# Patient Record
Sex: FEMALE | Race: BLACK OR AFRICAN AMERICAN | NOT HISPANIC OR LATINO
[De-identification: names, ages, dates, MRNs, and addresses within clinical notes are randomized per-mention and may not be internally consistent; named-entity substitution may affect disease eponyms.]

---

## 2017-10-30 PROBLEM — Z00.00 ENCOUNTER FOR PREVENTIVE HEALTH EXAMINATION: Status: ACTIVE | Noted: 2017-10-30

## 2017-10-31 ENCOUNTER — LABORATORY RESULT (OUTPATIENT)
Age: 82
End: 2017-10-31

## 2017-10-31 ENCOUNTER — APPOINTMENT (OUTPATIENT)
Dept: NEPHROLOGY | Facility: CLINIC | Age: 82
End: 2017-10-31
Payer: MEDICARE

## 2017-10-31 VITALS — SYSTOLIC BLOOD PRESSURE: 149 MMHG | HEART RATE: 71 BPM | DIASTOLIC BLOOD PRESSURE: 64 MMHG

## 2017-10-31 VITALS — HEART RATE: 85 BPM | DIASTOLIC BLOOD PRESSURE: 101 MMHG | SYSTOLIC BLOOD PRESSURE: 162 MMHG

## 2017-10-31 VITALS — HEIGHT: 67 IN | WEIGHT: 192 LBS | BODY MASS INDEX: 30.13 KG/M2

## 2017-10-31 DIAGNOSIS — Z87.891 PERSONAL HISTORY OF NICOTINE DEPENDENCE: ICD-10-CM

## 2017-10-31 DIAGNOSIS — Z78.9 OTHER SPECIFIED HEALTH STATUS: ICD-10-CM

## 2017-10-31 PROCEDURE — 99215 OFFICE O/P EST HI 40 MIN: CPT | Mod: 25

## 2017-10-31 PROCEDURE — 36415 COLL VENOUS BLD VENIPUNCTURE: CPT

## 2017-10-31 RX ORDER — ASPIRIN ENTERIC COATED TABLETS 81 MG 81 MG/1
81 TABLET, DELAYED RELEASE ORAL
Qty: 30 | Refills: 0 | Status: ACTIVE | COMMUNITY
Start: 2016-10-31

## 2017-10-31 RX ORDER — LATANOPROST/PF 0.005 %
0.01 DROPS OPHTHALMIC (EYE)
Qty: 5 | Refills: 0 | Status: ACTIVE | COMMUNITY
Start: 2017-08-04

## 2017-10-31 RX ORDER — INSULIN LISPRO 100 [IU]/ML
100 INJECTION, SOLUTION INTRAVENOUS; SUBCUTANEOUS
Qty: 90 | Refills: 0 | Status: ACTIVE | COMMUNITY
Start: 2017-10-31

## 2017-10-31 RX ORDER — INSULIN DETEMIR 100 [IU]/ML
100 INJECTION, SOLUTION SUBCUTANEOUS
Qty: 45 | Refills: 0 | Status: ACTIVE | COMMUNITY
Start: 2017-04-25

## 2017-10-31 RX ORDER — PEN NEEDLE, DIABETIC 29 G X1/2"
31G X 5 MM NEEDLE, DISPOSABLE MISCELLANEOUS
Qty: 100 | Refills: 0 | Status: ACTIVE | COMMUNITY
Start: 2017-05-26

## 2017-10-31 RX ORDER — FUROSEMIDE 20 MG/1
20 TABLET ORAL
Qty: 30 | Refills: 0 | Status: DISCONTINUED | COMMUNITY
Start: 2017-09-04

## 2017-10-31 RX ORDER — OMEPRAZOLE 20 MG/1
20 CAPSULE, DELAYED RELEASE ORAL
Qty: 30 | Refills: 0 | Status: ACTIVE | COMMUNITY
Start: 2016-11-29

## 2017-10-31 RX ORDER — BRIMONIDINE TARTRATE, TIMOLOL MALEATE 2; 5 MG/ML; MG/ML
0.2-0.5 SOLUTION/ DROPS OPHTHALMIC
Qty: 10 | Refills: 0 | Status: ACTIVE | COMMUNITY
Start: 2017-04-25

## 2017-10-31 RX ORDER — LOSARTAN POTASSIUM 100 MG/1
100 TABLET, FILM COATED ORAL
Qty: 90 | Refills: 0 | Status: ACTIVE | COMMUNITY
Start: 2017-03-27

## 2017-11-05 LAB
25(OH)D3 SERPL-MCNC: 25.2 NG/ML
ALBUMIN SERPL ELPH-MCNC: 4 G/DL
ALP BLD-CCNC: 177 U/L
ALT SERPL-CCNC: 14 U/L
ANION GAP SERPL CALC-SCNC: 16 MMOL/L
APPEARANCE: CLEAR
AST SERPL-CCNC: 12 U/L
BASOPHILS # BLD AUTO: 0.02 K/UL
BASOPHILS NFR BLD AUTO: 0.3 %
BILIRUB SERPL-MCNC: 0.4 MG/DL
BILIRUBIN URINE: NEGATIVE
BLOOD URINE: NEGATIVE
BUN SERPL-MCNC: 51 MG/DL
CALCIUM SERPL-MCNC: 9.5 MG/DL
CALCIUM SERPL-MCNC: 9.5 MG/DL
CHLORIDE SERPL-SCNC: 97 MMOL/L
CO2 SERPL-SCNC: 25 MMOL/L
COLOR: YELLOW
CREAT SERPL-MCNC: 2.36 MG/DL
CREAT SPEC-SCNC: 61 MG/DL
EOSINOPHIL # BLD AUTO: 0.13 K/UL
EOSINOPHIL NFR BLD AUTO: 1.7 %
GLUCOSE QUALITATIVE U: 1000 MG/DL
GLUCOSE SERPL-MCNC: 316 MG/DL
HBA1C MFR BLD HPLC: 9.4 %
HCT VFR BLD CALC: 33.7 %
HGB BLD-MCNC: 10.5 G/DL
IMM GRANULOCYTES NFR BLD AUTO: 0.3 %
KETONES URINE: NEGATIVE
LEUKOCYTE ESTERASE URINE: NEGATIVE
LYMPHOCYTES # BLD AUTO: 0.95 K/UL
LYMPHOCYTES NFR BLD AUTO: 12.5 %
MAGNESIUM SERPL-MCNC: 2 MG/DL
MAN DIFF?: NORMAL
MCHC RBC-ENTMCNC: 27.1 PG
MCHC RBC-ENTMCNC: 31.2 GM/DL
MCV RBC AUTO: 86.9 FL
MICROALBUMIN 24H UR DL<=1MG/L-MCNC: 33.3 MG/DL
MICROALBUMIN/CREAT 24H UR-RTO: 546 MG/G
MONOCYTES # BLD AUTO: 0.35 K/UL
MONOCYTES NFR BLD AUTO: 4.6 %
NEUTROPHILS # BLD AUTO: 6.12 K/UL
NEUTROPHILS NFR BLD AUTO: 80.6 %
NITRITE URINE: NEGATIVE
PARATHYROID HORMONE INTACT: 169 PG/ML
PH URINE: 6
PHOSPHATE SERPL-MCNC: 3.4 MG/DL
PLATELET # BLD AUTO: 281 K/UL
POTASSIUM SERPL-SCNC: 4.2 MMOL/L
PROT SERPL-MCNC: 7.3 G/DL
PROTEIN URINE: 100 MG/DL
RBC # BLD: 3.88 M/UL
RBC # FLD: 16.5 %
SODIUM SERPL-SCNC: 138 MMOL/L
SPECIFIC GRAVITY URINE: 1.02
TSH SERPL-ACNC: 1.71 UIU/ML
URATE SERPL-MCNC: 8 MG/DL
UROBILINOGEN URINE: NEGATIVE MG/DL
VIT B12 SERPL-MCNC: 761 PG/ML
WBC # FLD AUTO: 7.59 K/UL

## 2017-12-20 ENCOUNTER — APPOINTMENT (OUTPATIENT)
Dept: NEPHROLOGY | Facility: CLINIC | Age: 82
End: 2017-12-20
Payer: MEDICARE

## 2017-12-20 VITALS — SYSTOLIC BLOOD PRESSURE: 157 MMHG | HEART RATE: 67 BPM | DIASTOLIC BLOOD PRESSURE: 81 MMHG

## 2017-12-20 VITALS — BODY MASS INDEX: 29.13 KG/M2 | WEIGHT: 186 LBS

## 2017-12-20 PROCEDURE — 36415 COLL VENOUS BLD VENIPUNCTURE: CPT

## 2017-12-20 PROCEDURE — 99215 OFFICE O/P EST HI 40 MIN: CPT | Mod: 25

## 2017-12-20 RX ORDER — AMLODIPINE BESYLATE 10 MG/1
10 TABLET ORAL
Qty: 90 | Refills: 3 | Status: ACTIVE | COMMUNITY
Start: 2017-04-12 | End: 1900-01-01

## 2017-12-22 LAB
25(OH)D3 SERPL-MCNC: 22.6 NG/ML
ALBUMIN SERPL ELPH-MCNC: 3.9 G/DL
ALP BLD-CCNC: 156 U/L
ALT SERPL-CCNC: 19 U/L
ANION GAP SERPL CALC-SCNC: 16 MMOL/L
APPEARANCE: CLEAR
AST SERPL-CCNC: 15 U/L
BASOPHILS # BLD AUTO: 0.02 K/UL
BASOPHILS NFR BLD AUTO: 0.3 %
BILIRUB SERPL-MCNC: 0.4 MG/DL
BILIRUBIN URINE: NEGATIVE
BLOOD URINE: NEGATIVE
BUN SERPL-MCNC: 49 MG/DL
CALCIUM SERPL-MCNC: 9.7 MG/DL
CALCIUM SERPL-MCNC: 9.7 MG/DL
CHLORIDE SERPL-SCNC: 97 MMOL/L
CO2 SERPL-SCNC: 26 MMOL/L
COLOR: YELLOW
CREAT SERPL-MCNC: 2.15 MG/DL
CREAT SPEC-SCNC: 23 MG/DL
EOSINOPHIL # BLD AUTO: 0.07 K/UL
EOSINOPHIL NFR BLD AUTO: 1.1 %
GLUCOSE QUALITATIVE U: 1000 MG/DL
GLUCOSE SERPL-MCNC: 366 MG/DL
HBA1C MFR BLD HPLC: 12.2 %
HCT VFR BLD CALC: 32.1 %
HGB BLD-MCNC: 9.5 G/DL
IMM GRANULOCYTES NFR BLD AUTO: 0.2 %
KETONES URINE: NEGATIVE
LEUKOCYTE ESTERASE URINE: NEGATIVE
LYMPHOCYTES # BLD AUTO: 0.98 K/UL
LYMPHOCYTES NFR BLD AUTO: 15.3 %
MAGNESIUM SERPL-MCNC: 2 MG/DL
MAN DIFF?: NORMAL
MCHC RBC-ENTMCNC: 24.7 PG
MCHC RBC-ENTMCNC: 29.6 GM/DL
MCV RBC AUTO: 83.4 FL
MICROALBUMIN 24H UR DL<=1MG/L-MCNC: 10.1 MG/DL
MICROALBUMIN/CREAT 24H UR-RTO: 439 MG/G
MONOCYTES # BLD AUTO: 0.37 K/UL
MONOCYTES NFR BLD AUTO: 5.8 %
NEUTROPHILS # BLD AUTO: 4.97 K/UL
NEUTROPHILS NFR BLD AUTO: 77.3 %
NITRITE URINE: NEGATIVE
PARATHYROID HORMONE INTACT: 142 PG/ML
PH URINE: 5.5
PHOSPHATE SERPL-MCNC: 3.6 MG/DL
PLATELET # BLD AUTO: 313 K/UL
POTASSIUM SERPL-SCNC: 4.6 MMOL/L
PROT SERPL-MCNC: 7.4 G/DL
PROTEIN URINE: NEGATIVE MG/DL
RBC # BLD: 3.85 M/UL
RBC # FLD: 16.5 %
SODIUM SERPL-SCNC: 139 MMOL/L
SPECIFIC GRAVITY URINE: 1.01
URATE SERPL-MCNC: 8.4 MG/DL
UROBILINOGEN URINE: NEGATIVE MG/DL
VIT B12 SERPL-MCNC: 894 PG/ML
WBC # FLD AUTO: 6.42 K/UL

## 2018-02-21 ENCOUNTER — APPOINTMENT (OUTPATIENT)
Dept: NEPHROLOGY | Facility: CLINIC | Age: 83
End: 2018-02-21
Payer: MEDICARE

## 2018-02-21 VITALS — DIASTOLIC BLOOD PRESSURE: 66 MMHG | SYSTOLIC BLOOD PRESSURE: 124 MMHG

## 2018-02-21 VITALS — SYSTOLIC BLOOD PRESSURE: 126 MMHG | DIASTOLIC BLOOD PRESSURE: 53 MMHG | HEART RATE: 72 BPM

## 2018-02-21 VITALS — BODY MASS INDEX: 30.23 KG/M2 | WEIGHT: 193 LBS

## 2018-02-21 PROCEDURE — 99215 OFFICE O/P EST HI 40 MIN: CPT

## 2018-04-03 ENCOUNTER — APPOINTMENT (OUTPATIENT)
Dept: ENDOCRINOLOGY | Facility: CLINIC | Age: 83
End: 2018-04-03
Payer: MEDICARE

## 2018-04-03 ENCOUNTER — LABORATORY RESULT (OUTPATIENT)
Age: 83
End: 2018-04-03

## 2018-04-03 VITALS
HEART RATE: 76 BPM | HEIGHT: 67 IN | SYSTOLIC BLOOD PRESSURE: 122 MMHG | DIASTOLIC BLOOD PRESSURE: 63 MMHG | WEIGHT: 186 LBS | BODY MASS INDEX: 29.19 KG/M2

## 2018-04-03 PROCEDURE — 99204 OFFICE O/P NEW MOD 45 MIN: CPT | Mod: 25

## 2018-04-03 PROCEDURE — 36415 COLL VENOUS BLD VENIPUNCTURE: CPT

## 2018-04-03 RX ORDER — REPAGLINIDE 0.5 MG/1
0.5 TABLET ORAL 3 TIMES DAILY
Qty: 90 | Refills: 6 | Status: ACTIVE | COMMUNITY
Start: 2018-04-03 | End: 1900-01-01

## 2018-04-04 LAB
24R-OH-CALCIDIOL SERPL-MCNC: 43.6 PG/ML
25(OH)D3 SERPL-MCNC: 22 NG/ML
ALBUMIN SERPL ELPH-MCNC: 3.9 G/DL
ALP BLD-CCNC: 118 U/L
ALT SERPL-CCNC: 21 U/L
ANION GAP SERPL CALC-SCNC: 20 MMOL/L
AST SERPL-CCNC: 18 U/L
BASOPHILS # BLD AUTO: 0 K/UL
BASOPHILS NFR BLD AUTO: 0 %
BILIRUB SERPL-MCNC: 0.2 MG/DL
BUN SERPL-MCNC: 48 MG/DL
CALCIUM SERPL-MCNC: 9.6 MG/DL
CHLORIDE SERPL-SCNC: 104 MMOL/L
CHOLEST SERPL-MCNC: 152 MG/DL
CHOLEST/HDLC SERPL: 2 RATIO
CO2 SERPL-SCNC: 21 MMOL/L
CREAT SERPL-MCNC: 2.1 MG/DL
CREAT SPEC-SCNC: 114 MG/DL
EOSINOPHIL # BLD AUTO: 0.54 K/UL
EOSINOPHIL NFR BLD AUTO: 8.5 %
GLUCOSE SERPL-MCNC: 71 MG/DL
HBA1C MFR BLD HPLC: 10.1 %
HCT VFR BLD CALC: 26.3 %
HDLC SERPL-MCNC: 75 MG/DL
HGB BLD-MCNC: 7.2 G/DL
LDLC SERPL CALC-MCNC: 58 MG/DL
LYMPHOCYTES # BLD AUTO: 2.05 K/UL
LYMPHOCYTES NFR BLD AUTO: 32.1 %
MAN DIFF?: NORMAL
MCHC RBC-ENTMCNC: 19.2 PG
MCHC RBC-ENTMCNC: 27.4 GM/DL
MCV RBC AUTO: 70.1 FL
MICROALBUMIN 24H UR DL<=1MG/L-MCNC: 67.8 MG/DL
MICROALBUMIN/CREAT 24H UR-RTO: 595 MG/G
MONOCYTES # BLD AUTO: 0.84 K/UL
MONOCYTES NFR BLD AUTO: 13.2 %
NEUTROPHILS # BLD AUTO: 2.89 K/UL
NEUTROPHILS NFR BLD AUTO: 45.3 %
PLATELET # BLD AUTO: 280 K/UL
POTASSIUM SERPL-SCNC: 4.1 MMOL/L
PROT SERPL-MCNC: 7.5 G/DL
RBC # BLD: 3.75 M/UL
RBC # FLD: 19.9 %
SODIUM SERPL-SCNC: 145 MMOL/L
TRIGL SERPL-MCNC: 95 MG/DL
WBC # FLD AUTO: 6.39 K/UL

## 2018-04-05 LAB
T3FREE SERPL-MCNC: 2.52 PG/ML
T4 FREE SERPL-MCNC: 1.4 NG/DL
TSH SERPL-ACNC: 2.09 UIU/ML

## 2018-04-18 ENCOUNTER — APPOINTMENT (OUTPATIENT)
Dept: ENDOCRINOLOGY | Facility: CLINIC | Age: 83
End: 2018-04-18
Payer: MEDICARE

## 2018-04-18 VITALS
WEIGHT: 184 LBS | HEART RATE: 92 BPM | SYSTOLIC BLOOD PRESSURE: 119 MMHG | BODY MASS INDEX: 28.88 KG/M2 | HEIGHT: 67 IN | DIASTOLIC BLOOD PRESSURE: 59 MMHG

## 2018-04-18 PROCEDURE — 99211 OFF/OP EST MAY X REQ PHY/QHP: CPT | Mod: 25

## 2018-04-18 PROCEDURE — 82962 GLUCOSE BLOOD TEST: CPT

## 2018-04-18 RX ORDER — REPAGLINIDE 1 MG/1
1 TABLET ORAL 3 TIMES DAILY
Qty: 90 | Refills: 6 | Status: ACTIVE | COMMUNITY
Start: 2018-04-18 | End: 1900-01-01

## 2018-04-25 ENCOUNTER — APPOINTMENT (OUTPATIENT)
Dept: NEPHROLOGY | Facility: CLINIC | Age: 83
End: 2018-04-25
Payer: MEDICARE

## 2018-04-25 ENCOUNTER — LABORATORY RESULT (OUTPATIENT)
Age: 83
End: 2018-04-25

## 2018-04-25 VITALS — SYSTOLIC BLOOD PRESSURE: 131 MMHG | DIASTOLIC BLOOD PRESSURE: 61 MMHG

## 2018-04-25 VITALS — DIASTOLIC BLOOD PRESSURE: 47 MMHG | SYSTOLIC BLOOD PRESSURE: 111 MMHG | HEART RATE: 72 BPM

## 2018-04-25 PROCEDURE — 99215 OFFICE O/P EST HI 40 MIN: CPT | Mod: 25

## 2018-04-25 PROCEDURE — 36415 COLL VENOUS BLD VENIPUNCTURE: CPT

## 2018-04-25 RX ORDER — HYDROCHLOROTHIAZIDE 25 MG/1
25 TABLET ORAL
Qty: 90 | Refills: 0 | Status: DISCONTINUED | COMMUNITY
Start: 2017-06-16 | End: 2018-04-25

## 2018-04-26 ENCOUNTER — INPATIENT (INPATIENT)
Facility: HOSPITAL | Age: 83
LOS: 0 days | Discharge: HOME | DRG: 683 | End: 2018-04-27
Attending: INTERNAL MEDICINE | Admitting: INTERNAL MEDICINE
Payer: MEDICARE

## 2018-04-26 VITALS
DIASTOLIC BLOOD PRESSURE: 64 MMHG | TEMPERATURE: 97 F | SYSTOLIC BLOOD PRESSURE: 141 MMHG | OXYGEN SATURATION: 98 % | RESPIRATION RATE: 16 BRPM | HEART RATE: 75 BPM | WEIGHT: 187.83 LBS

## 2018-04-26 DIAGNOSIS — I15.0 RENOVASCULAR HYPERTENSION: ICD-10-CM

## 2018-04-26 DIAGNOSIS — R63.8 OTHER SYMPTOMS AND SIGNS CONCERNING FOOD AND FLUID INTAKE: ICD-10-CM

## 2018-04-26 DIAGNOSIS — E11.22 TYPE 2 DIABETES MELLITUS WITH DIABETIC CHRONIC KIDNEY DISEASE: ICD-10-CM

## 2018-04-26 DIAGNOSIS — D64.9 ANEMIA, UNSPECIFIED: ICD-10-CM

## 2018-04-26 DIAGNOSIS — Z29.9 ENCOUNTER FOR PROPHYLACTIC MEASURES, UNSPECIFIED: ICD-10-CM

## 2018-04-26 LAB
25(OH)D3 SERPL-MCNC: 20.8 NG/ML
ALBUMIN SERPL ELPH-MCNC: 3.9 G/DL
ALP BLD-CCNC: 157 U/L
ALT SERPL-CCNC: 32 U/L
ANION GAP SERPL CALC-SCNC: 17 MMOL/L
ANISOCYTOSIS BLD QL: SIGNIFICANT CHANGE UP
AST SERPL-CCNC: 25 U/L
B-OH-BUTYR SERPL-SCNC: 0.1 MMOL/L — SIGNIFICANT CHANGE UP
BASOPHILS # BLD AUTO: 0.02 K/UL
BASOPHILS NFR BLD AUTO: 0.3 %
BASOPHILS NFR BLD AUTO: 3 % — HIGH (ref 0–2)
BILIRUB SERPL-MCNC: 0.3 MG/DL
BUN SERPL-MCNC: 56 MG/DL
CALCIUM SERPL-MCNC: 9.4 MG/DL
CALCIUM SERPL-MCNC: 9.4 MG/DL
CHLORIDE SERPL-SCNC: 101 MMOL/L
CK MB CFR SERPL CALC: 2.8 NG/ML — SIGNIFICANT CHANGE UP (ref 0–6.7)
CK SERPL-CCNC: 55 U/L — SIGNIFICANT CHANGE UP (ref 25–170)
CO2 SERPL-SCNC: 23 MMOL/L
CREAT SERPL-MCNC: 2.42 MG/DL
ELLIPTOCYTES BLD QL SMEAR: SLIGHT — SIGNIFICANT CHANGE UP
EOSINOPHIL # BLD AUTO: 0.09 K/UL
EOSINOPHIL NFR BLD AUTO: 1.5 %
EOSINOPHIL NFR BLD AUTO: 4 % — SIGNIFICANT CHANGE UP (ref 0–6)
FERRITIN SERPL-MCNC: 10 NG/ML
FERRITIN SERPL-MCNC: 10 NG/ML — LOW (ref 15–150)
FOLATE SERPL-MCNC: 9.6 NG/ML
GLUCOSE BLDC GLUCOMTR-MCNC: 320 MG/DL — HIGH (ref 70–99)
GLUCOSE BLDC GLUCOMTR-MCNC: 327 MG/DL — HIGH (ref 70–99)
GLUCOSE BLDC GLUCOMTR-MCNC: 441 MG/DL — HIGH (ref 70–99)
GLUCOSE SERPL-MCNC: 311 MG/DL
HCT VFR BLD CALC: 25 %
HCYS SERPL-MCNC: 19.6 UMOL/L
HGB BLD-MCNC: 6.7 G/DL
IMM GRANULOCYTES NFR BLD AUTO: 0.2 %
IRON SATN MFR SERPL: 25 UG/DL — LOW (ref 30–160)
IRON SATN MFR SERPL: 5 %
IRON SATN MFR SERPL: 7 % — LOW (ref 14–50)
IRON SERPL-MCNC: 16 UG/DL
LYMPHOCYTES # BLD AUTO: 0.69 K/UL
LYMPHOCYTES # BLD AUTO: 30 % — SIGNIFICANT CHANGE UP (ref 13–44)
LYMPHOCYTES NFR BLD AUTO: 11.5 %
M PROTEIN SPEC IFE-MCNC: NORMAL
MACROCYTES BLD QL: SIGNIFICANT CHANGE UP
MAGNESIUM SERPL-MCNC: 2.2 MG/DL — SIGNIFICANT CHANGE UP (ref 1.6–2.6)
MAGNESIUM SERPL-MCNC: 2.3 MG/DL
MAN DIFF?: NORMAL
MANUAL SMEAR VERIFICATION: SIGNIFICANT CHANGE UP
MCHC RBC-ENTMCNC: 18.8 PG
MCHC RBC-ENTMCNC: 26.8 GM/DL
MCV RBC AUTO: 70.2 FL
MICROCYTES BLD QL: SIGNIFICANT CHANGE UP
MONOCYTES # BLD AUTO: 0.3 K/UL
MONOCYTES NFR BLD AUTO: 2 % — SIGNIFICANT CHANGE UP (ref 2–14)
MONOCYTES NFR BLD AUTO: 5 %
NEUTROPHILS # BLD AUTO: 4.91 K/UL
NEUTROPHILS NFR BLD AUTO: 61 % — SIGNIFICANT CHANGE UP (ref 43–77)
NEUTROPHILS NFR BLD AUTO: 81.5 %
PARATHYROID HORMONE INTACT: 173 PG/ML
PHOSPHATE SERPL-MCNC: 3.8 MG/DL — SIGNIFICANT CHANGE UP (ref 2.5–4.5)
PHOSPHATE SERPL-MCNC: 4.5 MG/DL
PLAT MORPH BLD: (no result)
PLATELET # BLD AUTO: 343 K/UL
POIKILOCYTOSIS BLD QL AUTO: SIGNIFICANT CHANGE UP
POLYCHROMASIA BLD QL SMEAR: SLIGHT — SIGNIFICANT CHANGE UP
POTASSIUM SERPL-SCNC: 5 MMOL/L
PROT SERPL-MCNC: 7.1 G/DL
RBC # BLD: 3.56 M/UL
RBC # BLD: 3.56 M/UL
RBC # FLD: 21.9 %
RBC BLD AUTO: (no result)
RETICS # AUTO: 2.2 %
RETICS AGGREG/RBC NFR: 78.8 K/UL
RETICS/RBC NFR: 2.4 % — SIGNIFICANT CHANGE UP (ref 0.5–2.5)
SCHISTOCYTES BLD QL AUTO: SIGNIFICANT CHANGE UP
SODIUM SERPL-SCNC: 141 MMOL/L
TIBC SERPL-MCNC: 337 UG/DL
TIBC SERPL-MCNC: 346 UG/DL — SIGNIFICANT CHANGE UP (ref 220–430)
TROPONIN T SERPL-MCNC: 0.04 NG/ML — HIGH (ref 0–0.01)
UIBC SERPL-MCNC: 321 UG/DL
UIBC SERPL-MCNC: 321 UG/DL — SIGNIFICANT CHANGE UP (ref 110–370)
URATE SERPL-MCNC: 8.1 MG/DL
VIT B12 SERPL-MCNC: 944 PG/ML
WBC # FLD AUTO: 6.02 K/UL

## 2018-04-26 PROCEDURE — 93010 ELECTROCARDIOGRAM REPORT: CPT

## 2018-04-26 PROCEDURE — 99284 EMERGENCY DEPT VISIT MOD MDM: CPT | Mod: 25

## 2018-04-26 PROCEDURE — 99223 1ST HOSP IP/OBS HIGH 75: CPT | Mod: GC

## 2018-04-26 PROCEDURE — 86077 PHYS BLOOD BANK SERV XMATCH: CPT

## 2018-04-26 RX ORDER — SODIUM CHLORIDE 9 MG/ML
1000 INJECTION, SOLUTION INTRAVENOUS
Qty: 0 | Refills: 0 | Status: DISCONTINUED | OUTPATIENT
Start: 2018-04-26 | End: 2018-04-27

## 2018-04-26 RX ORDER — PANTOPRAZOLE SODIUM 20 MG/1
40 TABLET, DELAYED RELEASE ORAL
Qty: 0 | Refills: 0 | Status: DISCONTINUED | OUTPATIENT
Start: 2018-04-27 | End: 2018-04-27

## 2018-04-26 RX ORDER — SODIUM CHLORIDE 9 MG/ML
1000 INJECTION INTRAMUSCULAR; INTRAVENOUS; SUBCUTANEOUS
Qty: 0 | Refills: 0 | Status: DISCONTINUED | OUTPATIENT
Start: 2018-04-26 | End: 2018-04-27

## 2018-04-26 RX ORDER — DEXTROSE 50 % IN WATER 50 %
1 SYRINGE (ML) INTRAVENOUS ONCE
Qty: 0 | Refills: 0 | Status: DISCONTINUED | OUTPATIENT
Start: 2018-04-26 | End: 2018-04-27

## 2018-04-26 RX ORDER — INSULIN HUMAN 100 [IU]/ML
8 INJECTION, SOLUTION SUBCUTANEOUS ONCE
Qty: 0 | Refills: 0 | Status: COMPLETED | OUTPATIENT
Start: 2018-04-26 | End: 2018-04-26

## 2018-04-26 RX ORDER — BRIMONIDINE TARTRATE, TIMOLOL MALEATE 2; 5 MG/ML; MG/ML
1 SOLUTION/ DROPS OPHTHALMIC
Qty: 0 | Refills: 0 | COMMUNITY

## 2018-04-26 RX ORDER — AMLODIPINE BESYLATE 2.5 MG/1
1 TABLET ORAL
Qty: 0 | Refills: 0 | COMMUNITY

## 2018-04-26 RX ORDER — LOSARTAN POTASSIUM 100 MG/1
100 TABLET, FILM COATED ORAL
Qty: 0 | Refills: 0 | COMMUNITY

## 2018-04-26 RX ORDER — ASPIRIN/CALCIUM CARB/MAGNESIUM 324 MG
1 TABLET ORAL
Qty: 0 | Refills: 0 | COMMUNITY

## 2018-04-26 RX ORDER — INSULIN LISPRO 100/ML
VIAL (ML) SUBCUTANEOUS
Qty: 0 | Refills: 0 | Status: DISCONTINUED | OUTPATIENT
Start: 2018-04-26 | End: 2018-04-27

## 2018-04-26 RX ORDER — LATANOPROST 0.05 MG/ML
1 SOLUTION/ DROPS OPHTHALMIC; TOPICAL
Qty: 0 | Refills: 0 | COMMUNITY

## 2018-04-26 RX ORDER — INSULIN GLARGINE 100 [IU]/ML
30 INJECTION, SOLUTION SUBCUTANEOUS AT BEDTIME
Qty: 0 | Refills: 0 | Status: DISCONTINUED | OUTPATIENT
Start: 2018-04-26 | End: 2018-04-27

## 2018-04-26 RX ORDER — CHLORTHALIDONE 50 MG
1 TABLET ORAL
Qty: 0 | Refills: 0 | COMMUNITY

## 2018-04-26 RX ORDER — REPAGLINIDE 1 MG/1
1 TABLET ORAL
Qty: 0 | Refills: 0 | COMMUNITY

## 2018-04-26 RX ORDER — GLUCAGON INJECTION, SOLUTION 0.5 MG/.1ML
1 INJECTION, SOLUTION SUBCUTANEOUS ONCE
Qty: 0 | Refills: 0 | Status: DISCONTINUED | OUTPATIENT
Start: 2018-04-26 | End: 2018-04-27

## 2018-04-26 RX ORDER — INSULIN DETEMIR 100/ML (3)
60 INSULIN PEN (ML) SUBCUTANEOUS
Qty: 0 | Refills: 0 | COMMUNITY

## 2018-04-26 RX ORDER — INSULIN GLARGINE 100 [IU]/ML
10 INJECTION, SOLUTION SUBCUTANEOUS AT BEDTIME
Qty: 0 | Refills: 0 | Status: DISCONTINUED | OUTPATIENT
Start: 2018-04-26 | End: 2018-04-26

## 2018-04-26 RX ORDER — DEXTROSE 50 % IN WATER 50 %
12.5 SYRINGE (ML) INTRAVENOUS ONCE
Qty: 0 | Refills: 0 | Status: DISCONTINUED | OUTPATIENT
Start: 2018-04-26 | End: 2018-04-27

## 2018-04-26 RX ADMIN — INSULIN GLARGINE 30 UNIT(S): 100 INJECTION, SOLUTION SUBCUTANEOUS at 22:50

## 2018-04-26 RX ADMIN — SODIUM CHLORIDE 1000 MILLILITER(S): 9 INJECTION INTRAMUSCULAR; INTRAVENOUS; SUBCUTANEOUS at 17:29

## 2018-04-26 RX ADMIN — INSULIN HUMAN 8 UNIT(S): 100 INJECTION, SOLUTION SUBCUTANEOUS at 17:28

## 2018-04-26 RX ADMIN — Medication 8: at 22:45

## 2018-04-26 NOTE — H&P ADULT - ASSESSMENT
83 yr old female with a PMHx of IDDM, HTN, CKD Stage III/IV presenting from home with acute on chronic symptomatic microcytic anemia with Hb of 6.7 requiring tx.

## 2018-04-26 NOTE — H&P ADULT - PMH
Anemia, unspecified type    Chronic kidney disease, unspecified CKD stage    Diabetes    Gastroesophageal reflux disease without esophagitis    Renovascular hypertension

## 2018-04-26 NOTE — H&P ADULT - NSHPLABSRESULTS_GEN_ALL_CORE
LABS:                         6.7    5.7   )-----------( 300      ( 26 Apr 2018 13:15 )             23.6     04-26    141  |  99  |  56<H>  ----------------------------<  417<H>  4.8   |  21<L>  |  2.15<H>    Ca    9.1      26 Apr 2018 13:15    TPro  7.2  /  Alb  3.8  /  TBili  0.3  /  DBili  x   /  AST  25  /  ALT  31  /  AlkPhos  143<H>  04-26    PT/INR - ( 26 Apr 2018 14:15 )   PT: 11.2 sec;   INR: 1.01          PTT - ( 26 Apr 2018 14:15 )  PTT:35.2 sec              RADIOLOGY, EKG & ADDITIONAL TESTS: Reviewed.

## 2018-04-26 NOTE — H&P ADULT - ATTENDING COMMENTS
Pt seen and examined at bedside on 4/26/2018 @ 2050    Agree with HPI, ROS as above.     VS, Labs, FH, SH, allergies, medications, imaging reviewed. I personally reviewed the EKG - 1st degree block, NSR. Agree with physical exam as above     A/P: 83 yr old female with a PMHx of IDDM, HTN, CKD Stage III/IV presenting from home with acute on chronic symptomatic microcytic anemia with Hb of 6.7 requiring tx.    **Anemia  -Pt with HgB of 6.7, microcytic anemia; symptomatic  -Pt with known anemia on outpatient labs, last HgB in 3/2018 - 7.2, microcytic  then as well  -Pt found to have labs c/w iron deficiency as an outpatient but no further workup was done (patient reportedly refused to see GI or have further evaluation)  -No evidence of acute bleed, and likely slow/chronic process  -Check reticulocyte count, low suspicion for hemolysis as bilirubin wnl  -Start NC  -Pt to receive 1 unit pRBC - check post transfusion CBC with AM labs  -Maintain 2 large bore IV, active T/S    Plan otherwise as outlined above.....

## 2018-04-26 NOTE — H&P ADULT - PROBLEM SELECTOR PLAN 4
-patient poorly compliant with low salt diet  -home regimen at home: amlodipine and chlorthalidone  -unknown EF; will assess CXR in AM to assess for fluid overload and diurese as needed -patient poorly compliant with low salt diet  -home regimen at home: amlodipine and chlorthalidone  -unknown EF; lung check in AM to assess for fluid overload; diurese as clinically indicated  -c/w home amlodipine 10 mg qd

## 2018-04-26 NOTE — ED ADULT NURSE NOTE - OBJECTIVE STATEMENT
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

## 2018-04-26 NOTE — H&P ADULT - HISTORY OF PRESENT ILLNESS
83 yr old female with a PMHx of anemia (unclear etiology per patient), IDDM, HTN, CKD III/IV and GERD presenting with symptomatic anemia discovered at her nephrologist's office (Dr. Obrien). Patient went to for routine followup at Dr. Obrien's office yesterday where she was complaining of dizziness and weakness and she had bloodwork done. Dr Obrien called patient today and made her aware she had an Hgb of 6.7 and advised to come to ED for a blood transfusion. Patient states she was originally diagnosed with anemia in college but cant recollect an etiology or provide much history on this remote diagnosis. She states 3 years ago she received a transfusion for anemia but has never had another one until now. She denies any NSAID use, any history of PUD, any melena or hematochezia, any hx of GI bleed in the past. She 83 yr old female with a PMHx of anemia (unclear etiology per patient but Hgb was 10.5 in 11/2017), IDDM, HTN, CKD III/IV (baseline Cr 2.2) and GERD presenting with symptomatic anemia discovered at her nephrologist's office (Dr. Obrien). Patient went to for routine followup at Dr. Obrien's office yesterday where she was complaining of dizziness and weakness and she had bloodwork done. Dr Obrien called patient today and made her aware she had an Hgb of 6.7 and advised to come to ED for a blood transfusion. Patient states she was originally diagnosed with anemia in college but cant recollect an etiology or provide much history on this remote diagnosis. She states 3 years ago she received a transfusion for anemia but has never had another one until now. She denies any NSAID use, any history of PUD, any melena or hematochezia, any hx of GI bleed in the past. Patient denies any chest pain, SOB, abdominal pain, recent weight loss, B-symptoms but does endorse weakness and dizziness for the past month. She denies any family hx of anemia. Of note, patient was recently hospitalized at [facility] in March 2018 but can not provide a good history as to why she was there but states it was for "anemia and fluid in the chest".     Vital Signs Last 24 Hrs  T Max: 97.6 HR: 78 BP: 164/71 RR: 16 SpO2: 98%   CBC Full   WBC Count : 5.7 K/uL  Hemoglobin : 6.7 g/dL  Hematocrit : 23.6 %  Platelet Count - Automated : 300 K/uL  Mean Cell Volume : 66.3 fL  Mean Cell Hemoglobin : 18.8 pg  Mean Cell Hemoglobin Concentration : 28.4 g/dL  Auto Neutrophil # : x  Auto Lymphocyte # : x  Auto Monocyte # : x  Auto Eosinophil # : x  Auto Basophil # : x  Auto Neutrophil % : 77.3 %  Auto Lymphocyte % : 14.6 %  Auto Monocyte % : 6.3 %  Auto Eosinophil % : 1.4 %  Auto Basophil % : 0.4 % 83 yr old female with a PMHx of anemia (unclear etiology per patient but Hgb was 10.5 in 11/2017), IDDM, HTN, CKD III/IV (baseline Cr 2.2), b/l glaucoma (legally blind) and GERD presenting with symptomatic anemia discovered at her nephrologist's office (Dr. Obrien). Patient went to for routine followup at Dr. Obrien's office yesterday where she was complaining of dizziness and weakness and she had bloodwork done. Dr Obrien called patient today and made her aware she had an Hgb of 6.7 and advised to come to ED for a blood transfusion. Patient states she was originally diagnosed with anemia in college but cant recollect an etiology or provide much history on this remote diagnosis. She states 3 years ago she received a transfusion for anemia and underwent an EGD that was non-revealing. Patient has never had another transfusion until now. She denies any NSAID use, any history of PUD, any melena or hematochezia, any hx of GI bleed in the past. Patient denies any chest pain, SOB, abdominal pain, recent weight loss, B-symptoms but does endorse weakness and dizziness for the past month. She denies any family hx of anemia. Of note, patient was recently hospitalized at [facility] in March 2018 but can not provide a good history as to why she was there but states it was for "anemia and fluid in the chest".     Vital Signs Last 24 Hrs  T Max: 97.6 HR: 78 BP: 164/71 RR: 16 SpO2: 98%   CBC Full   WBC Count : 5.7 K/uL  Hemoglobin : 6.7 g/dL  Hematocrit : 23.6 %  Platelet Count - Automated : 300 K/uL  Mean Cell Volume : 66.3 fL  Mean Cell Hemoglobin : 18.8 pg  Mean Cell Hemoglobin Concentration : 28.4 g/dL  Auto Neutrophil # : x  Auto Lymphocyte # : x  Auto Monocyte # : x  Auto Eosinophil # : x  Auto Basophil # : x  Auto Neutrophil % : 77.3 %  Auto Lymphocyte % : 14.6 %  Auto Monocyte % : 6.3 %  Auto Eosinophil % : 1.4 %  Auto Basophil % : 0.4 % 83 yr old female with a PMHx of anemia (unclear etiology per patient but Hgb was 10.5 in 11/2017), IDDM, HTN, CKD III/IV (baseline Cr 2.2), b/l glaucoma (legally blind) and GERD presenting with symptomatic anemia discovered at her nephrologist's office (Dr. Obrien). Patient went to for routine followup at Dr. Obrien's office yesterday where she was complaining of dizziness and weakness and she had bloodwork done. Dr Obrien called patient today and made her aware she had an Hgb of 6.7 and advised to come to ED for a blood transfusion. Patient states she was originally diagnosed with anemia in college but cant recollect an etiology or provide much history on this remote diagnosis. She states 3 years ago she received a transfusion for anemia and underwent an EGD that was non-revealing. Patient has never had another transfusion until now. She denies any NSAID use, any history of PUD, any melena or hematochezia, any hx of GI bleed in the past. Patient denies any chest pain, SOB, abdominal pain, recent weight loss, B-symptoms but does endorse weakness and dizziness for the past month. She denies any family hx of anemia. Of note, patient was recently hospitalized at [facility] in March 2018 but can not provide a good history as to why she was there but states it was for "anemia and fluid in the chest".   Patient's last colonoscopy in 2016 with >20 polyps per patient; recommended to have repeat colonoscopy 3 years from prior.    Vital Signs Last 24 Hrs  T Max: 97.6 HR: 78 BP: 164/71 RR: 16 SpO2: 98%   CBC Full   WBC Count : 5.7 K/uL  Hemoglobin : 6.7 g/dL  Hematocrit : 23.6 %  Platelet Count - Automated : 300 K/uL  Mean Cell Volume : 66.3 fL  Mean Cell Hemoglobin : 18.8 pg  Mean Cell Hemoglobin Concentration : 28.4 g/dL  Auto Neutrophil # : x  Auto Lymphocyte # : x  Auto Monocyte # : x  Auto Eosinophil # : x  Auto Basophil # : x  Auto Neutrophil % : 77.3 %  Auto Lymphocyte % : 14.6 %  Auto Monocyte % : 6.3 %  Auto Eosinophil % : 1.4 %  Auto Basophil % : 0.4 %

## 2018-04-26 NOTE — ED PROVIDER NOTE - MEDICAL DECISION MAKING DETAILS
84 y/o female with renal insufficiency, with symptomatic anemia. No hx of GI bleed. Most likely anemia due to iron deficiency, chronic disease. Case discussed with pt's renal specialist. consented to start transfusion PRBCs in ED. 84 y/o female with renal insufficiency, with symptomatic anemia. No hx of GI bleed. Most likely anemia due to iron deficiency, chronic disease. Case discussed with pt's renal specialist. consented to start transfusion PRBCs in ED. Elevated glucose addressed with IV hydration and insulin. Not in DKA

## 2018-04-26 NOTE — H&P ADULT - PROBLEM SELECTOR PLAN 1
-acute microcytic anemia that has progressively worsened over the past ~6 mos (Hb 10.5 in 11/2017) without a clear etiology; patient denying melena, hematochezia, NSAID use, hx of PUD  -rectal exam (-) for blood or melanotic stool  -FOBT pending  -further evaluation with peripheral smear, iron studies  - s/p 2U PRBCs in ED w/ plan for post-transfusion CBC at 2 am  - IV PPI 40 mg BID  - GI consult in AM for further evaluation; NPO after midnight for potential intervention -acute microcytic anemia that has progressively worsened over the past ~6 mos (Hb 10.5 in 11/2017) without a clear etiology; patient denying melena, hematochezia, NSAID use, hx of PUD  -rectal exam (-) for blood or melanotic stool  FOBT pending  -further evaluation with peripheral smear, iron studies  -reticulocyte index 0.6 s/f inadequate bone marrow response  - s/p 2U PRBCs in ED w/ plan for post-transfusion CBC at 2 am  - continue home ppi   - GI consult in AM for further evaluation

## 2018-04-26 NOTE — H&P ADULT - PROBLEM SELECTOR PROBLEM 2
Type 2 diabetes mellitus with chronic kidney disease, with long-term current use of insulin, unspecified CKD stage

## 2018-04-26 NOTE — H&P ADULT - NSHPPHYSICALEXAM_GEN_ALL_CORE
VITAL SIGNS:  T(F): 97.6 (04-26-18 @ 18:02), Max: 97.6 (04-26-18 @ 18:02)  HR: 78 (04-26-18 @ 18:02) (71 - 78)  BP: 164/73 (04-26-18 @ 18:02) (137/78 - 164/73)  BP(mean): --  RR: 16 (04-26-18 @ 18:02) (16 - 16)  SpO2: 98% (04-26-18 @ 18:02) (97% - 98%)    PHYSICAL EXAM:    Constitutional: WDWN resting comfortably in bed; NAD  HEENT: NC/AT, PERRL, EOMI, anicteric sclera, no nasal discharge; uvula midline, no oropharyngeal erythema or exudates; MMM  Neck: supple; no JVD or thyromegaly  Respiratory: CTA B/L; no W/R/R, no retractions  Cardiac: +S1/S2; RRR; no M/R/G; PMI non-displaced  Gastrointestinal: soft, NT/ND; no rebound or guarding; +BSx4  Back: spine midline, no bony tenderness or step-offs; no CVAT B/L  Extremities: WWP, no clubbing or cyanosis; no peripheral edema  Musculoskeletal: NROM x4; no joint swelling, tenderness or erythema  Vascular: 2+ radial, femoral, DP/PT pulses B/L; 1+ BL LE pitting edema  Dermatologic: skin warm, dry and intact; no rashes, wounds, or scars  Neurologic: AAOx3; CNII-XII grossly intact; no focal deficits  Psychiatric: affect and characteristics of appearance, verbalizations, behaviors are appropriate, denies SI/HI/AH/VH

## 2018-04-26 NOTE — ED PROVIDER NOTE - OBJECTIVE STATEMENT
82 y/o female with history renal insuffiencey presents with several weeks of feeling weak and fatigued.  Pt. was sent to the ED for 84 y/o female with history renal insuffiencey presents with several weeks of feeling weak and fatigued.  Pt. was sent to the ED for repeat of hemoglobin as was found to have low by PMD.  Pt. st she had a recent admission to [facility] for SOB, did not receive blood transfusion. Pt unclear of final diagnosis or etiology of SOB. Patient st she required blood transfusion about 3 years ago.

## 2018-04-26 NOTE — H&P ADULT - NSHPREVIEWOFSYSTEMS_GEN_ALL_CORE
REVIEW OF SYSTEMS:    CONSTITUTIONAL: (+) weakness, denies fevers or chills  EYES/ENT: No visual changes;  No vertigo or throat pain   NECK: No pain or stiffness  RESPIRATORY: No cough, wheezing, hemoptysis; No shortness of breath  CARDIOVASCULAR: No chest pain or palpitations  GASTROINTESTINAL: No abdominal or epigastric pain. No nausea, vomiting, or hematemesis; No diarrhea or constipation. No melena or hematochezia.  GENITOURINARY: No dysuria, frequency or hematuria  NEUROLOGICAL: No numbness or weakness; (+) dizziness  SKIN: No itching, rashes

## 2018-04-26 NOTE — H&P ADULT - PROBLEM SELECTOR PLAN 2
-last A1c 10 in April 2018; patient poorly compliant with insulin regimen  -sliding scale with lantus 10 U -last A1c 10 in April 2018; patient poorly compliant with insulin regimen  -home DM regimen: 60U levemir at night  -sliding scale with lantus 30 U while in house

## 2018-04-27 ENCOUNTER — TRANSCRIPTION ENCOUNTER (OUTPATIENT)
Age: 83
End: 2018-04-27

## 2018-04-27 VITALS
SYSTOLIC BLOOD PRESSURE: 151 MMHG | OXYGEN SATURATION: 95 % | RESPIRATION RATE: 17 BRPM | TEMPERATURE: 99 F | DIASTOLIC BLOOD PRESSURE: 70 MMHG | HEART RATE: 79 BPM

## 2018-04-27 LAB
ALBUMIN SERPL ELPH-MCNC: 3.3 G/DL — SIGNIFICANT CHANGE UP (ref 3.3–5)
ALP SERPL-CCNC: 116 U/L — SIGNIFICANT CHANGE UP (ref 40–120)
ALT FLD-CCNC: 26 U/L — SIGNIFICANT CHANGE UP (ref 10–45)
ANION GAP SERPL CALC-SCNC: 11 MMOL/L — SIGNIFICANT CHANGE UP (ref 5–17)
APPEARANCE UR: CLEAR — SIGNIFICANT CHANGE UP
AST SERPL-CCNC: 15 U/L — SIGNIFICANT CHANGE UP (ref 10–40)
BACTERIA # UR AUTO: PRESENT /HPF
BILIRUB SERPL-MCNC: 0.4 MG/DL — SIGNIFICANT CHANGE UP (ref 0.2–1.2)
BILIRUB UR-MCNC: NEGATIVE — SIGNIFICANT CHANGE UP
BUN SERPL-MCNC: 46 MG/DL — HIGH (ref 7–23)
CALCIUM SERPL-MCNC: 8.9 MG/DL — SIGNIFICANT CHANGE UP (ref 8.4–10.5)
CHLORIDE SERPL-SCNC: 107 MMOL/L — SIGNIFICANT CHANGE UP (ref 96–108)
CO2 SERPL-SCNC: 21 MMOL/L — LOW (ref 22–31)
COLOR SPEC: YELLOW — SIGNIFICANT CHANGE UP
CREAT SERPL-MCNC: 2.24 MG/DL — HIGH (ref 0.5–1.3)
DIFF PNL FLD: NEGATIVE — SIGNIFICANT CHANGE UP
EPI CELLS # UR: (no result) /HPF (ref 0–5)
GLUCOSE BLDC GLUCOMTR-MCNC: 116 MG/DL — HIGH (ref 70–99)
GLUCOSE BLDC GLUCOMTR-MCNC: 156 MG/DL — HIGH (ref 70–99)
GLUCOSE BLDC GLUCOMTR-MCNC: 194 MG/DL — HIGH (ref 70–99)
GLUCOSE SERPL-MCNC: 217 MG/DL — HIGH (ref 70–99)
GLUCOSE UR QL: 500
HBA1C BLD-MCNC: 8.4 % — HIGH (ref 4–5.6)
HBA1C BLD-MCNC: 8.4 % — HIGH (ref 4–5.6)
HCT VFR BLD CALC: 27.3 % — LOW (ref 34.5–45)
HCT VFR BLD CALC: 29.1 % — LOW (ref 34.5–45)
HGB BLD-MCNC: 8.4 G/DL — LOW (ref 11.5–15.5)
HGB BLD-MCNC: 8.8 G/DL — LOW (ref 11.5–15.5)
INR BLD: 1 — SIGNIFICANT CHANGE UP (ref 0.88–1.16)
KETONES UR-MCNC: NEGATIVE — SIGNIFICANT CHANGE UP
LEUKOCYTE ESTERASE UR-ACNC: NEGATIVE — SIGNIFICANT CHANGE UP
MCHC RBC-ENTMCNC: 21.1 PG — LOW (ref 27–34)
MCHC RBC-ENTMCNC: 21.4 PG — LOW (ref 27–34)
MCHC RBC-ENTMCNC: 30.2 G/DL — LOW (ref 32–36)
MCHC RBC-ENTMCNC: 30.8 G/DL — LOW (ref 32–36)
MCV RBC AUTO: 69.5 FL — LOW (ref 80–100)
MCV RBC AUTO: 69.8 FL — LOW (ref 80–100)
NITRITE UR-MCNC: NEGATIVE — SIGNIFICANT CHANGE UP
OB PNL STL: NEGATIVE — SIGNIFICANT CHANGE UP
PH UR: 5.5 — SIGNIFICANT CHANGE UP (ref 5–8)
PLATELET # BLD AUTO: 259 K/UL — SIGNIFICANT CHANGE UP (ref 150–400)
PLATELET # BLD AUTO: 267 K/UL — SIGNIFICANT CHANGE UP (ref 150–400)
POTASSIUM SERPL-MCNC: 4.4 MMOL/L — SIGNIFICANT CHANGE UP (ref 3.5–5.3)
POTASSIUM SERPL-SCNC: 4.4 MMOL/L — SIGNIFICANT CHANGE UP (ref 3.5–5.3)
PROT SERPL-MCNC: 6.7 G/DL — SIGNIFICANT CHANGE UP (ref 6–8.3)
PROT UR-MCNC: 30 MG/DL
PROTHROM AB SERPL-ACNC: 11.1 SEC — SIGNIFICANT CHANGE UP (ref 9.8–12.7)
RBC # BLD: 3.93 M/UL — SIGNIFICANT CHANGE UP (ref 3.8–5.2)
RBC # BLD: 4.17 M/UL — SIGNIFICANT CHANGE UP (ref 3.8–5.2)
RBC # FLD: 20.7 % — HIGH (ref 10.3–16.9)
RBC # FLD: 20.8 % — HIGH (ref 10.3–16.9)
SODIUM SERPL-SCNC: 139 MMOL/L — SIGNIFICANT CHANGE UP (ref 135–145)
SP GR SPEC: 1.01 — SIGNIFICANT CHANGE UP (ref 1–1.03)
TROPONIN T SERPL-MCNC: 0.04 NG/ML — HIGH (ref 0–0.01)
UROBILINOGEN FLD QL: 0.2 E.U./DL — SIGNIFICANT CHANGE UP
WBC # BLD: 7.3 K/UL — SIGNIFICANT CHANGE UP (ref 3.8–10.5)
WBC # BLD: 7.3 K/UL — SIGNIFICANT CHANGE UP (ref 3.8–10.5)
WBC # FLD AUTO: 7.3 K/UL — SIGNIFICANT CHANGE UP (ref 3.8–10.5)
WBC # FLD AUTO: 7.3 K/UL — SIGNIFICANT CHANGE UP (ref 3.8–10.5)
WBC UR QL: < 5 /HPF — SIGNIFICANT CHANGE UP

## 2018-04-27 PROCEDURE — 86901 BLOOD TYPING SEROLOGIC RH(D): CPT

## 2018-04-27 PROCEDURE — 85025 COMPLETE CBC W/AUTO DIFF WBC: CPT

## 2018-04-27 PROCEDURE — 36415 COLL VENOUS BLD VENIPUNCTURE: CPT

## 2018-04-27 PROCEDURE — 82010 KETONE BODYS QUAN: CPT

## 2018-04-27 PROCEDURE — 84100 ASSAY OF PHOSPHORUS: CPT

## 2018-04-27 PROCEDURE — 85730 THROMBOPLASTIN TIME PARTIAL: CPT

## 2018-04-27 PROCEDURE — 71045 X-RAY EXAM CHEST 1 VIEW: CPT | Mod: 26

## 2018-04-27 PROCEDURE — 71045 X-RAY EXAM CHEST 1 VIEW: CPT

## 2018-04-27 PROCEDURE — 83036 HEMOGLOBIN GLYCOSYLATED A1C: CPT

## 2018-04-27 PROCEDURE — 82728 ASSAY OF FERRITIN: CPT

## 2018-04-27 PROCEDURE — 99285 EMERGENCY DEPT VISIT HI MDM: CPT | Mod: 25

## 2018-04-27 PROCEDURE — 85045 AUTOMATED RETICULOCYTE COUNT: CPT

## 2018-04-27 PROCEDURE — 93005 ELECTROCARDIOGRAM TRACING: CPT

## 2018-04-27 PROCEDURE — 83550 IRON BINDING TEST: CPT

## 2018-04-27 PROCEDURE — 86921 COMPATIBILITY TEST INCUBATE: CPT

## 2018-04-27 PROCEDURE — 85610 PROTHROMBIN TIME: CPT

## 2018-04-27 PROCEDURE — 83735 ASSAY OF MAGNESIUM: CPT

## 2018-04-27 PROCEDURE — 80053 COMPREHEN METABOLIC PANEL: CPT

## 2018-04-27 PROCEDURE — 86904 BLOOD TYPING PATIENT SERUM: CPT

## 2018-04-27 PROCEDURE — 85027 COMPLETE CBC AUTOMATED: CPT

## 2018-04-27 PROCEDURE — 82962 GLUCOSE BLOOD TEST: CPT

## 2018-04-27 PROCEDURE — 81001 URINALYSIS AUTO W/SCOPE: CPT

## 2018-04-27 PROCEDURE — 99239 HOSP IP/OBS DSCHRG MGMT >30: CPT

## 2018-04-27 PROCEDURE — 86880 COOMBS TEST DIRECT: CPT

## 2018-04-27 PROCEDURE — 86900 BLOOD TYPING SEROLOGIC ABO: CPT

## 2018-04-27 PROCEDURE — P9016: CPT

## 2018-04-27 PROCEDURE — 82550 ASSAY OF CK (CPK): CPT

## 2018-04-27 PROCEDURE — 86850 RBC ANTIBODY SCREEN: CPT

## 2018-04-27 PROCEDURE — 86922 COMPATIBILITY TEST ANTIGLOB: CPT

## 2018-04-27 PROCEDURE — 86870 RBC ANTIBODY IDENTIFICATION: CPT

## 2018-04-27 PROCEDURE — 36430 TRANSFUSION BLD/BLD COMPNT: CPT

## 2018-04-27 PROCEDURE — 84484 ASSAY OF TROPONIN QUANT: CPT

## 2018-04-27 PROCEDURE — 82553 CREATINE MB FRACTION: CPT

## 2018-04-27 RX ORDER — SODIUM FERRIC GLUCONAT/SUCROSE 62.5MG/5ML
100 AMPUL (ML) INTRAVENOUS ONCE
Qty: 0 | Refills: 0 | Status: COMPLETED | OUTPATIENT
Start: 2018-04-27 | End: 2018-04-27

## 2018-04-27 RX ORDER — SODIUM FERRIC GLUCONAT/SUCROSE 62.5MG/5ML
25 AMPUL (ML) INTRAVENOUS ONCE
Qty: 0 | Refills: 0 | Status: COMPLETED | OUTPATIENT
Start: 2018-04-27 | End: 2018-04-27

## 2018-04-27 RX ORDER — PANTOPRAZOLE SODIUM 20 MG/1
1 TABLET, DELAYED RELEASE ORAL
Qty: 30 | Refills: 0 | OUTPATIENT
Start: 2018-04-27 | End: 2018-05-26

## 2018-04-27 RX ORDER — SODIUM FERRIC GLUCONAT/SUCROSE 62.5MG/5ML
150 AMPUL (ML) INTRAVENOUS ONCE
Qty: 0 | Refills: 0 | Status: DISCONTINUED | OUTPATIENT
Start: 2018-04-27 | End: 2018-04-27

## 2018-04-27 RX ORDER — LATANOPROST 0.05 MG/ML
1 SOLUTION/ DROPS OPHTHALMIC; TOPICAL AT BEDTIME
Qty: 0 | Refills: 0 | Status: DISCONTINUED | OUTPATIENT
Start: 2018-04-27 | End: 2018-04-27

## 2018-04-27 RX ORDER — OMEPRAZOLE 10 MG/1
1 CAPSULE, DELAYED RELEASE ORAL
Qty: 0 | Refills: 0 | COMMUNITY

## 2018-04-27 RX ADMIN — Medication 2: at 08:49

## 2018-04-27 RX ADMIN — PANTOPRAZOLE SODIUM 40 MILLIGRAM(S): 20 TABLET, DELAYED RELEASE ORAL at 06:25

## 2018-04-27 RX ADMIN — Medication 204 MILLIGRAM(S): at 13:47

## 2018-04-27 RX ADMIN — Medication 108 MILLIGRAM(S): at 14:35

## 2018-04-27 NOTE — DISCHARGE NOTE ADULT - MEDICATION SUMMARY - MEDICATIONS TO TAKE
I will START or STAY ON the medications listed below when I get home from the hospital:    Aspir 81  -- 1 tab(s) by mouth once a day  -- Indication: For Prophylaxis    losartan  -- 100 milligram(s) by mouth once a day  -- Indication: For Renovascular hypertension    Levemir FlexPen 100 units/mL subcutaneous solution  -- 60 unit(s) subcutaneous once a day (at bedtime)  -- Indication: For Diabetes    repaglinide 0.5 mg oral tablet  -- 1 tab(s) by mouth 3 times a day (before meals)  -- Indication: For Diabetes    amLODIPine 10 mg oral tablet  -- 1 tab(s) by mouth once a day  -- Indication: For Renovascular hypertension    chlorthalidone 25 mg oral tablet  -- 1 tab(s) by mouth once a day  -- Indication: For Renovascular hypertension    Combigan 0.2%-0.5% ophthalmic solution  -- 1 drop(s) to each affected eye every 12 hours  -- Indication: For Glaucoma    latanoprost ophthalmic  -- 1 dose(s) to each affected eye once a day  -- Indication: For Glaucoma    omeprazole 20 mg oral delayed release tablet  -- 1 tab(s) by mouth once a day  -- Indication: For Gastroesophageal reflux disease without esophagitis

## 2018-04-27 NOTE — DISCHARGE NOTE ADULT - PLAN OF CARE
You were previously diagnosed with GERD. This means you have an abnormally high production of acid in your stomach. Please continue to take your medications as prescribed and follow up with your PCP for further management. You were diagnosed with CKD Stage IV. Your kidney function is at its baseline. Please follow up with your nephrologist for further monitoring. Resolution of your anemia. You were diagnosed with anemia and received 2 units of blood. Your anemia is likely caused by many things, including iron deficiency as well as kidney disease. You should follow up with your PCP for a referral to a hematologist to receive IV Iron to replete your iron stores. You should also follow up with your GI doctor for workup to evaluate for sources of possible bleeding. You were previously diagnosed with Diabetes. Please continue on your home medications at this time and follow up with your PMD for further surveillance and management of your Diabetes. You were previously diagnosed with high blood pressure. Please continue on your home medications at this time and follow up with your PMD for further surveillance and management of your high blood pressure.

## 2018-04-27 NOTE — DISCHARGE NOTE ADULT - PATIENT PORTAL LINK FT
You can access the [facility], offered by [facility], by registering with the following website: [email or web address]

## 2018-04-27 NOTE — DISCHARGE NOTE ADULT - PROVIDER TOKENS
FREE:[LAST:[Tod],FIRST:[Gamal],PHONE:[phone]:[(   )    -],ADDRESS:[[address] [place], [place] [id number] FREE:[LAST:[Tod],FIRST:[Gamal],PHONE:[phone]:[(   )    -],ADDRESS:[[address] [place] [address]]],FREE:[LAST:[Finkelstein],FIRST:[Salo],PHONE:[phone]:[(   )    -],ADDRESS:[[address] [place] [address]]]

## 2018-04-27 NOTE — DISCHARGE NOTE ADULT - HOSPITAL COURSE
83 yr old female with a PMHx of anemia (unclear etiology per patient but Hgb was 10.5 in 11/2017), IDDM, HTN, CKD III/IV (baseline Cr 2.2), b/l glaucoma (legally blind) and GERD presenting with symptomatic anemia discovered at her nephrologist's office (Dr. Obrien). Patient went to for routine followup at Dr. Obrien's office yesterday where she was complaining of dizziness and weakness and found to have Hgb of 6.7 and came into the ED. Rectal exam was negative for blood or melena. She was found to have symptomatic microcytic anemia with low iron and ferritin. She received 2U PRBC with improvement of Hgb to 8.4. She also received 125mg IV iron. Her Hgb remained stable and her symptoms improved. She was cleared for discharge home to follow up with her PCP and GI doctor for outpatient workup of anemia.

## 2018-04-27 NOTE — DISCHARGE NOTE ADULT - CARE PLAN
Principal Discharge DX:	Iron deficiency anemia, unspecified iron deficiency anemia type  Goal:	Resolution of your anemia.  Assessment and plan of treatment:	You were diagnosed with anemia and received 2 units of blood. Your anemia is likely caused by many things, including iron deficiency as well as kidney disease. You should follow up with your PCP for a referral to a hematologist to receive IV Iron to replete your iron stores. You should also follow up with your GI doctor for workup to evaluate for sources of possible bleeding.  Secondary Diagnosis:	Diabetes  Assessment and plan of treatment:	You were previously diagnosed with Diabetes. Please continue on your home medications at this time and follow up with your PMD for further surveillance and management of your Diabetes.  Secondary Diagnosis:	Renovascular hypertension  Assessment and plan of treatment:	You were previously diagnosed with high blood pressure. Please continue on your home medications at this time and follow up with your PMD for further surveillance and management of your high blood pressure.  Secondary Diagnosis:	Gastroesophageal reflux disease without esophagitis  Assessment and plan of treatment:	You were previously diagnosed with GERD. This means you have an abnormally high production of acid in your stomach. Please continue to take your medications as prescribed and follow up with your PCP for further management.  Secondary Diagnosis:	Stage 4 chronic kidney disease  Assessment and plan of treatment:	You were diagnosed with CKD Stage IV. Your kidney function is at its baseline. Please follow up with your nephrologist for further monitoring.

## 2018-04-27 NOTE — DISCHARGE NOTE ADULT - CARE PROVIDER_API CALL
Gamal Baron  [address] [place], [place] [id number]  Phone: [phone]  Fax: (       - Gamal Baron  [address] [place] [address]  Phone: [phone]  Fax: (   )    -    Finkelstein, Steven  [address] [place] [id number]  Phone: [phone]  Fax: (   )    -

## 2018-04-27 NOTE — DISCHARGE NOTE ADULT - SECONDARY DIAGNOSIS.
Gastroesophageal reflux disease without esophagitis Stage 4 chronic kidney disease Diabetes Renovascular hypertension

## 2018-04-27 NOTE — DISCHARGE NOTE ADULT - ADDITIONAL INSTRUCTIONS
You have an appointment at your Primary Care Office on Monday, April 30th at 9am.     You have an appointment with your GI doctor, Dr. Durham, on May 17th at 3:30pm.

## 2018-04-30 LAB
HGB A MFR BLD: 98.4 %
HGB A2 MFR BLD: 1.6 %
HGB FRACT BLD-IMP: NORMAL
METHYLMALONATE SERPL-SCNC: 355 NMOL/L

## 2018-05-01 DIAGNOSIS — D64.9 ANEMIA, UNSPECIFIED: ICD-10-CM

## 2018-05-01 DIAGNOSIS — H54.8 LEGAL BLINDNESS, AS DEFINED IN USA: ICD-10-CM

## 2018-05-01 DIAGNOSIS — K21.9 GASTRO-ESOPHAGEAL REFLUX DISEASE WITHOUT ESOPHAGITIS: ICD-10-CM

## 2018-05-01 DIAGNOSIS — H40.9 UNSPECIFIED GLAUCOMA: ICD-10-CM

## 2018-05-01 DIAGNOSIS — D63.1 ANEMIA IN CHRONIC KIDNEY DISEASE: ICD-10-CM

## 2018-05-01 DIAGNOSIS — E11.22 TYPE 2 DIABETES MELLITUS WITH DIABETIC CHRONIC KIDNEY DISEASE: ICD-10-CM

## 2018-05-01 DIAGNOSIS — D50.9 IRON DEFICIENCY ANEMIA, UNSPECIFIED: ICD-10-CM

## 2018-05-01 DIAGNOSIS — E11.65 TYPE 2 DIABETES MELLITUS WITH HYPERGLYCEMIA: ICD-10-CM

## 2018-05-01 DIAGNOSIS — I12.9 HYPERTENSIVE CHRONIC KIDNEY DISEASE WITH STAGE 1 THROUGH STAGE 4 CHRONIC KIDNEY DISEASE, OR UNSPECIFIED CHRONIC KIDNEY DISEASE: ICD-10-CM

## 2018-05-01 DIAGNOSIS — N18.4 CHRONIC KIDNEY DISEASE, STAGE 4 (SEVERE): ICD-10-CM

## 2018-05-01 DIAGNOSIS — Z79.4 LONG TERM (CURRENT) USE OF INSULIN: ICD-10-CM

## 2018-05-09 ENCOUNTER — OUTPATIENT (OUTPATIENT)
Dept: OUTPATIENT SERVICES | Facility: HOSPITAL | Age: 83
LOS: 1 days | End: 2018-05-09
Payer: MEDICARE

## 2018-05-09 DIAGNOSIS — R06.09 OTHER FORMS OF DYSPNEA: ICD-10-CM

## 2018-05-09 DIAGNOSIS — R07.9 CHEST PAIN, UNSPECIFIED: ICD-10-CM

## 2018-05-09 PROCEDURE — A9505: CPT

## 2018-05-09 PROCEDURE — A9500: CPT

## 2018-05-09 PROCEDURE — 78452 HT MUSCLE IMAGE SPECT MULT: CPT

## 2018-05-09 PROCEDURE — 82962 GLUCOSE BLOOD TEST: CPT

## 2018-05-09 PROCEDURE — 93017 CV STRESS TEST TRACING ONLY: CPT

## 2018-05-22 ENCOUNTER — APPOINTMENT (OUTPATIENT)
Dept: NEPHROLOGY | Facility: CLINIC | Age: 83
End: 2018-05-22
Payer: MEDICARE

## 2018-05-22 ENCOUNTER — LABORATORY RESULT (OUTPATIENT)
Age: 83
End: 2018-05-22

## 2018-05-22 ENCOUNTER — APPOINTMENT (OUTPATIENT)
Dept: ENDOCRINOLOGY | Facility: CLINIC | Age: 83
End: 2018-05-22
Payer: MEDICARE

## 2018-05-22 VITALS — SYSTOLIC BLOOD PRESSURE: 169 MMHG | DIASTOLIC BLOOD PRESSURE: 79 MMHG | HEART RATE: 70 BPM

## 2018-05-22 VITALS
DIASTOLIC BLOOD PRESSURE: 72 MMHG | BODY MASS INDEX: 28.35 KG/M2 | WEIGHT: 181 LBS | HEART RATE: 86 BPM | SYSTOLIC BLOOD PRESSURE: 153 MMHG

## 2018-05-22 PROCEDURE — 99215 OFFICE O/P EST HI 40 MIN: CPT | Mod: 25

## 2018-05-22 PROCEDURE — 36415 COLL VENOUS BLD VENIPUNCTURE: CPT

## 2018-05-22 PROCEDURE — 99211 OFF/OP EST MAY X REQ PHY/QHP: CPT | Mod: 25

## 2018-05-22 PROCEDURE — 82962 GLUCOSE BLOOD TEST: CPT

## 2018-05-22 RX ORDER — CHLORTHALIDONE 25 MG/1
25 TABLET ORAL
Qty: 90 | Refills: 3 | Status: ACTIVE | COMMUNITY
Start: 2018-03-22 | End: 1900-01-01

## 2018-05-27 LAB
ALBUMIN SERPL ELPH-MCNC: 4 G/DL
ALP BLD-CCNC: 142 U/L
ALT SERPL-CCNC: 22 U/L
ANION GAP SERPL CALC-SCNC: 13 MMOL/L
AST SERPL-CCNC: 18 U/L
BASOPHILS # BLD AUTO: 0.06 K/UL
BASOPHILS NFR BLD AUTO: 0.9 %
BILIRUB SERPL-MCNC: 0.3 MG/DL
BUN SERPL-MCNC: 43 MG/DL
CALCIUM SERPL-MCNC: 9.8 MG/DL
CHLORIDE SERPL-SCNC: 100 MMOL/L
CO2 SERPL-SCNC: 24 MMOL/L
CREAT SERPL-MCNC: 2.12 MG/DL
EOSINOPHIL # BLD AUTO: 0.12 K/UL
EOSINOPHIL NFR BLD AUTO: 1.7 %
FERRITIN SERPL-MCNC: 23 NG/ML
GLUCOSE SERPL-MCNC: 284 MG/DL
HCT VFR BLD CALC: 31.8 %
HGB BLD-MCNC: 9.3 G/DL
IRON SATN MFR SERPL: 9 %
IRON SERPL-MCNC: 29 UG/DL
LYMPHOCYTES # BLD AUTO: 1.19 K/UL
LYMPHOCYTES NFR BLD AUTO: 17.2 %
MAN DIFF?: NORMAL
MCHC RBC-ENTMCNC: 22.3 PG
MCHC RBC-ENTMCNC: 29.2 GM/DL
MCV RBC AUTO: 76.3 FL
MONOCYTES # BLD AUTO: 0.3 K/UL
MONOCYTES NFR BLD AUTO: 4.3 %
NEUTROPHILS # BLD AUTO: 5.24 K/UL
NEUTROPHILS NFR BLD AUTO: 75.9 %
PLATELET # BLD AUTO: 311 K/UL
POTASSIUM SERPL-SCNC: 4.8 MMOL/L
PROT SERPL-MCNC: 7.1 G/DL
RBC # BLD: 4.17 M/UL
RBC # FLD: 25 %
SODIUM SERPL-SCNC: 137 MMOL/L
TIBC SERPL-MCNC: 310 UG/DL
UIBC SERPL-MCNC: 281 UG/DL
WBC # FLD AUTO: 6.9 K/UL

## 2018-06-27 ENCOUNTER — APPOINTMENT (OUTPATIENT)
Dept: NEPHROLOGY | Facility: CLINIC | Age: 83
End: 2018-06-27
Payer: MEDICARE

## 2018-06-27 ENCOUNTER — APPOINTMENT (OUTPATIENT)
Dept: ENDOCRINOLOGY | Facility: CLINIC | Age: 83
End: 2018-06-27
Payer: MEDICARE

## 2018-06-27 ENCOUNTER — LABORATORY RESULT (OUTPATIENT)
Age: 83
End: 2018-06-27

## 2018-06-27 VITALS — HEART RATE: 64 BPM | SYSTOLIC BLOOD PRESSURE: 146 MMHG | DIASTOLIC BLOOD PRESSURE: 79 MMHG

## 2018-06-27 VITALS — SYSTOLIC BLOOD PRESSURE: 136 MMHG | HEART RATE: 70 BPM | DIASTOLIC BLOOD PRESSURE: 73 MMHG

## 2018-06-27 VITALS — BODY MASS INDEX: 27.72 KG/M2 | WEIGHT: 177 LBS

## 2018-06-27 DIAGNOSIS — E66.9 OBESITY, UNSPECIFIED: ICD-10-CM

## 2018-06-27 DIAGNOSIS — N18.9 CHRONIC KIDNEY DISEASE, UNSPECIFIED: ICD-10-CM

## 2018-06-27 DIAGNOSIS — D63.1 CHRONIC KIDNEY DISEASE, UNSPECIFIED: ICD-10-CM

## 2018-06-27 DIAGNOSIS — N18.4 CHRONIC KIDNEY DISEASE, STAGE 4 (SEVERE): ICD-10-CM

## 2018-06-27 DIAGNOSIS — Z79.899 OTHER LONG TERM (CURRENT) DRUG THERAPY: ICD-10-CM

## 2018-06-27 DIAGNOSIS — I10 ESSENTIAL (PRIMARY) HYPERTENSION: ICD-10-CM

## 2018-06-27 PROCEDURE — 99214 OFFICE O/P EST MOD 30 MIN: CPT

## 2018-06-27 PROCEDURE — 99215 OFFICE O/P EST HI 40 MIN: CPT | Mod: 25

## 2018-06-27 PROCEDURE — 36415 COLL VENOUS BLD VENIPUNCTURE: CPT

## 2018-06-27 RX ORDER — INSULIN LISPRO 100 [IU]/ML
100 INJECTION, SOLUTION INTRAVENOUS; SUBCUTANEOUS
Qty: 5 | Refills: 11 | Status: ACTIVE | COMMUNITY
Start: 2018-06-27 | End: 1900-01-01

## 2018-06-27 RX ORDER — REPAGLINIDE 1 MG/1
1 TABLET ORAL TWICE DAILY
Qty: 60 | Refills: 6 | Status: ACTIVE | COMMUNITY
Start: 2018-06-27 | End: 1900-01-01

## 2018-06-27 RX ORDER — INSULIN DETEMIR 100 [IU]/ML
100 INJECTION, SOLUTION SUBCUTANEOUS
Qty: 2 | Refills: 11 | Status: ACTIVE | COMMUNITY
Start: 2018-06-27 | End: 1900-01-01

## 2018-06-30 LAB
ALBUMIN SERPL ELPH-MCNC: 3.8 G/DL
ALP BLD-CCNC: 145 U/L
ALT SERPL-CCNC: 9 U/L
ANION GAP SERPL CALC-SCNC: 15 MMOL/L
APPEARANCE: CLEAR
AST SERPL-CCNC: 11 U/L
BASOPHILS # BLD AUTO: 0.02 K/UL
BASOPHILS NFR BLD AUTO: 0.3 %
BILIRUB SERPL-MCNC: 0.3 MG/DL
BILIRUBIN URINE: NEGATIVE
BLOOD URINE: NEGATIVE
BUN SERPL-MCNC: 44 MG/DL
CALCIUM SERPL-MCNC: 9.5 MG/DL
CHLORIDE SERPL-SCNC: 100 MMOL/L
CO2 SERPL-SCNC: 23 MMOL/L
COLOR: YELLOW
CREAT SERPL-MCNC: 2.23 MG/DL
CREAT SPEC-SCNC: 91 MG/DL
EOSINOPHIL # BLD AUTO: 0.12 K/UL
EOSINOPHIL NFR BLD AUTO: 1.6 %
GLUCOSE QUALITATIVE U: 1000 MG/DL
GLUCOSE SERPL-MCNC: 433 MG/DL
HBA1C MFR BLD HPLC: 10 %
HCT VFR BLD CALC: 33.5 %
HGB BLD-MCNC: 10.1 G/DL
IMM GRANULOCYTES NFR BLD AUTO: 0.1 %
KETONES URINE: NEGATIVE
LEUKOCYTE ESTERASE URINE: NEGATIVE
LYMPHOCYTES # BLD AUTO: 0.67 K/UL
LYMPHOCYTES NFR BLD AUTO: 9 %
MAGNESIUM SERPL-MCNC: 1.8 MG/DL
MAN DIFF?: NORMAL
MCHC RBC-ENTMCNC: 24.5 PG
MCHC RBC-ENTMCNC: 30.1 GM/DL
MCV RBC AUTO: 81.3 FL
MICROALBUMIN 24H UR DL<=1MG/L-MCNC: 61.9 MG/DL
MICROALBUMIN/CREAT 24H UR-RTO: 680 MG/G
MONOCYTES # BLD AUTO: 0.37 K/UL
MONOCYTES NFR BLD AUTO: 5 %
NEUTROPHILS # BLD AUTO: 6.25 K/UL
NEUTROPHILS NFR BLD AUTO: 84 %
NITRITE URINE: NEGATIVE
PH URINE: 5
PLATELET # BLD AUTO: 261 K/UL
POTASSIUM SERPL-SCNC: 4.6 MMOL/L
PROT SERPL-MCNC: 7 G/DL
PROTEIN URINE: 100 MG/DL
RBC # BLD: 4.12 M/UL
RBC # FLD: 24.9 %
SODIUM SERPL-SCNC: 138 MMOL/L
SPECIFIC GRAVITY URINE: 1.02
UROBILINOGEN URINE: NEGATIVE MG/DL
WBC # FLD AUTO: 7.44 K/UL

## 2018-07-18 ENCOUNTER — APPOINTMENT (OUTPATIENT)
Dept: ENDOCRINOLOGY | Facility: CLINIC | Age: 83
End: 2018-07-18
Payer: MEDICARE

## 2018-07-18 VITALS
SYSTOLIC BLOOD PRESSURE: 138 MMHG | HEIGHT: 67 IN | HEART RATE: 70 BPM | BODY MASS INDEX: 25.58 KG/M2 | WEIGHT: 163 LBS | DIASTOLIC BLOOD PRESSURE: 73 MMHG

## 2018-07-18 DIAGNOSIS — E11.9 TYPE 2 DIABETES MELLITUS W/OUT COMPLICATIONS: ICD-10-CM

## 2018-07-18 PROBLEM — I15.0 RENOVASCULAR HYPERTENSION: Chronic | Status: ACTIVE | Noted: 2018-04-26

## 2018-07-18 PROBLEM — D64.9 ANEMIA, UNSPECIFIED: Chronic | Status: ACTIVE | Noted: 2018-04-26

## 2018-07-18 PROBLEM — K21.9 GASTRO-ESOPHAGEAL REFLUX DISEASE WITHOUT ESOPHAGITIS: Chronic | Status: ACTIVE | Noted: 2018-04-26

## 2018-07-18 PROBLEM — N18.9 CHRONIC KIDNEY DISEASE, UNSPECIFIED: Chronic | Status: ACTIVE | Noted: 2018-04-26

## 2018-07-18 PROCEDURE — 99211 OFF/OP EST MAY X REQ PHY/QHP: CPT | Mod: 25

## 2018-07-18 PROCEDURE — 97802 MEDICAL NUTRITION INDIV IN: CPT

## 2018-07-18 PROCEDURE — 82962 GLUCOSE BLOOD TEST: CPT

## 2018-07-18 RX ORDER — BLOOD SUGAR DIAGNOSTIC
STRIP MISCELLANEOUS
Qty: 4 | Refills: 3 | Status: ACTIVE | COMMUNITY
Start: 2018-07-18 | End: 1900-01-01

## 2018-07-18 RX ORDER — BLOOD-GLUCOSE METER
W/DEVICE KIT MISCELLANEOUS
Qty: 1 | Refills: 0 | Status: ACTIVE | COMMUNITY
Start: 2018-07-18 | End: 1900-01-01

## 2018-07-18 RX ORDER — BLOOD-GLUCOSE CONTROL, LOW
EACH MISCELLANEOUS
Qty: 2 | Refills: 3 | Status: ACTIVE | COMMUNITY
Start: 2018-07-18 | End: 1900-01-01

## 2018-09-11 PROBLEM — E55.9 VITAMIN D DEFICIENCY: Status: ACTIVE | Noted: 2018-09-11

## 2018-09-12 ENCOUNTER — APPOINTMENT (OUTPATIENT)
Dept: ENDOCRINOLOGY | Facility: CLINIC | Age: 83
End: 2018-09-12

## 2018-09-12 DIAGNOSIS — E55.9 VITAMIN D DEFICIENCY, UNSPECIFIED: ICD-10-CM

## 2018-10-01 ENCOUNTER — APPOINTMENT (OUTPATIENT)
Dept: ENDOCRINOLOGY | Facility: CLINIC | Age: 83
End: 2018-10-01

## 2018-10-24 ENCOUNTER — APPOINTMENT (OUTPATIENT)
Dept: NEPHROLOGY | Facility: CLINIC | Age: 83
End: 2018-10-24